# Patient Record
Sex: MALE | Race: WHITE | NOT HISPANIC OR LATINO | URBAN - METROPOLITAN AREA
[De-identification: names, ages, dates, MRNs, and addresses within clinical notes are randomized per-mention and may not be internally consistent; named-entity substitution may affect disease eponyms.]

---

## 2020-07-25 ENCOUNTER — TELEPHONE ENCOUNTER (OUTPATIENT)
Dept: URBAN - METROPOLITAN AREA CLINIC 13 | Facility: CLINIC | Age: 50
End: 2020-07-25

## 2020-07-26 ENCOUNTER — TELEPHONE ENCOUNTER (OUTPATIENT)
Dept: URBAN - METROPOLITAN AREA CLINIC 13 | Facility: CLINIC | Age: 50
End: 2020-07-26

## 2020-07-26 RX ORDER — ALPRAZOLAM 0.25 MG
TABLET ORAL
Refills: 0 | Status: ACTIVE | COMMUNITY

## 2024-08-28 ENCOUNTER — DASHBOARD ENCOUNTERS (OUTPATIENT)
Age: 54
End: 2024-08-28

## 2024-08-28 ENCOUNTER — OFFICE VISIT (OUTPATIENT)
Dept: URBAN - METROPOLITAN AREA CLINIC 113 | Facility: CLINIC | Age: 54
End: 2024-08-28
Payer: COMMERCIAL

## 2024-08-28 VITALS
SYSTOLIC BLOOD PRESSURE: 119 MMHG | HEART RATE: 84 BPM | TEMPERATURE: 97.9 F | RESPIRATION RATE: 20 BRPM | HEIGHT: 68 IN | WEIGHT: 156.6 LBS | BODY MASS INDEX: 23.73 KG/M2 | DIASTOLIC BLOOD PRESSURE: 85 MMHG

## 2024-08-28 DIAGNOSIS — Z12.11 COLON CANCER SCREENING: ICD-10-CM

## 2024-08-28 DIAGNOSIS — K64.8 OTHER HEMORRHOIDS: ICD-10-CM

## 2024-08-28 PROCEDURE — 99204 OFFICE O/P NEW MOD 45 MIN: CPT | Performed by: STUDENT IN AN ORGANIZED HEALTH CARE EDUCATION/TRAINING PROGRAM

## 2024-08-28 RX ORDER — SODIUM, POTASSIUM,MAG SULFATES 17.5-3.13G
177ML SOLUTION, RECONSTITUTED, ORAL ORAL
Qty: 354 MILLILITER | Refills: 0 | OUTPATIENT
Start: 2024-08-28 | End: 2024-08-30

## 2024-08-28 RX ORDER — HYDROCORTISONE 25 MG/G
1 APPLICATION CREAM TOPICAL TWICE A DAY
Qty: 1 KIT | Refills: 0 | OUTPATIENT
Start: 2024-08-28 | End: 2024-09-07

## 2024-08-28 RX ORDER — ALPRAZOLAM 0.25 MG
TABLET ORAL
Refills: 0 | Status: ACTIVE | COMMUNITY

## 2024-08-28 NOTE — HPI-TODAY'S VISIT:
Initial visit 8/28/2024; PCP Dr. Falcon Mr. Gomes is a 54-year-old male being referred to the GI clinic for evaluation of hemorrhoids.  He has a past medical history significant for situational anxiety, hyperlipidemia. Labs 2/26/2022: Liver enzymes unremarkable, A1c 5.4%. Last sigmoidoscopy 8/21/2018 performed for rectal pain: NURY normal, localized mild inflammation found in the rectum, few small-mouthed diverticula found in the sigmoid colon, exam otherwise unremarkable. He has been complaining of hemorrhoidal symptoms since May 2024, irritation and very small volume bleeding which he attributes to wiping. He denies rectal pain.

## 2024-09-20 ENCOUNTER — TELEPHONE ENCOUNTER (OUTPATIENT)
Dept: URBAN - METROPOLITAN AREA CLINIC 113 | Facility: CLINIC | Age: 54
End: 2024-09-20

## 2024-09-20 ENCOUNTER — OFFICE VISIT (OUTPATIENT)
Dept: URBAN - METROPOLITAN AREA MEDICAL CENTER 19 | Facility: MEDICAL CENTER | Age: 54
End: 2024-09-20

## 2024-09-20 DIAGNOSIS — K64.9 HEMORRHOIDS, UNSPECIFIED HEMORRHOID TYPE: ICD-10-CM

## 2024-09-20 RX ORDER — ALPRAZOLAM 0.25 MG
TABLET ORAL
Refills: 0 | Status: ACTIVE | COMMUNITY

## 2024-09-20 RX ORDER — HYDROCORTISONE 25 MG/G
1 APPLICATION CREAM TOPICAL TWICE A DAY
Qty: 1 KIT | Refills: 0
Start: 2024-08-28 | End: 2024-09-30